# Patient Record
Sex: FEMALE | Race: OTHER | HISPANIC OR LATINO | URBAN - METROPOLITAN AREA
[De-identification: names, ages, dates, MRNs, and addresses within clinical notes are randomized per-mention and may not be internally consistent; named-entity substitution may affect disease eponyms.]

---

## 2021-07-18 ENCOUNTER — EMERGENCY (EMERGENCY)
Facility: HOSPITAL | Age: 86
LOS: 1 days | Discharge: HOME | End: 2021-07-18
Attending: EMERGENCY MEDICINE
Payer: MEDICARE

## 2021-07-18 VITALS
RESPIRATION RATE: 20 BRPM | TEMPERATURE: 98 F | OXYGEN SATURATION: 97 % | WEIGHT: 160.06 LBS | SYSTOLIC BLOOD PRESSURE: 149 MMHG | DIASTOLIC BLOOD PRESSURE: 75 MMHG | HEART RATE: 68 BPM

## 2021-07-18 PROCEDURE — 99283 EMERGENCY DEPT VISIT LOW MDM: CPT

## 2021-07-18 PROCEDURE — 99282 EMERGENCY DEPT VISIT SF MDM: CPT

## 2021-07-18 RX ORDER — SODIUM CHLORIDE 9 MG/ML
500 INJECTION INTRAMUSCULAR; INTRAVENOUS; SUBCUTANEOUS ONCE
Refills: 0 | Status: DISCONTINUED | OUTPATIENT
Start: 2021-07-18 | End: 2021-07-23

## 2021-07-18 NOTE — ED STATDOCS - NSFOLLOWUPINSTRUCTIONS_ED_ALL_ED_FT
1. Follow up with your primary care physician within 2-3days for reevaluation.  2.  Return to the Emergency Department for worsening, progressive or any other concerning symptoms.     1. Yared un seguimiento con schroeder médico de atención primaria dentro de 2-3 días para la reevaluación.  2. Volver al [facility] por empeoramiento, síntomas progresivos o de cualquier otro tipo.       Diarrea en los adultos    Diarrhea, Adult      La diarrea consiste en deposiciones frecuentes, blandas o acuosas. La diarrea puede hacerlo sentir débil y deshidratarlo. La deshidratación puede causarle cansancio, sed, sequedad en la boca y disminución en la frecuencia con la que orina.    Generalmente, la diarrea dura entre 2 y 3 días. Sin embargo, puede durar más tiempo si se trata de un signo de algo más erika. Es importante tratar la diarrea jassi se lo haya indicado el médico.      Siga estas indicaciones en schroeder casa:      Comida y bebida                 Siga estas recomendaciones jassi se lo haya indicado el médico:  •Villas ernie solución de rehidratación oral (oral rehydration solution, ORS). Es un medicamento de venta nohelia que ayuda a que el cuerpo recupere el equilibrio normal de nutrientes y agua. Se la encuentra en farmacias y tiendas minoristas.      •Sparkle abundantes líquidos, jassi agua, trocitos de hielo, jugos de fruta rebajados con agua y bebidas deportivas bajas en calorías. Puede beber leche también, si lo desea.      •Evite consumir líquidos que contengan mucha azúcar o cafeína, jassi bebidas energéticas, bebidas deportivas y refrescos.      •En la medida en que pueda, consuma alimentos blandos y fáciles de digerir en pequeñas cantidades. Estos alimentos incluyen bananas, compota de manzana, arroz, kylah magras, tostadas y galletas saladas.      •Evite stephanie alcohol.      •Evite los alimentos condimentados o con alto contenido de grasa.      Medicamentos     •Villas los medicamentos de venta nohelia y los recetados solamente jassi se lo haya indicado el médico.      •Si le recetaron un antibiótico, tómelo jassi se lo haya indicado el médico. No deje de usar el antibiótico aunque comience a sentirse mejor.        Indicaciones generales    •Lávese las ying frecuentemente usando agua y jabón. Use desinfectante para ying si no dispone de agua y jabón. Las demás personas de la casa deben lavarse las ying también. Las ying deben lavarse:  •Después de usar el baño o cambiar un pañal.       •Antes de preparar, cocinar o servir la comida.       •Mientras cuida de ernie persona enferma, o cuando visita a alguien en el hospital.         •Sparkle suficiente líquido jassi para mantener la orina de color amarillo pálido.      •Descanse en schroeder casa mientras se recupera.      •Controle schroeder afección para detectar cualquier cambio.      •Villas un baño caliente para ayudar a disminuir el ardor o el dolor causados por los episodios frecuentes de diarrea.      •Concurra a todas las visitas de control jassi se lo haya indicado el médico. [place] es importante.        Comuníquese con un médico si:    •Tiene fiebre.      •La diarrea empeora.      •Aparecen nuevos síntomas.      •No puede retener los líquidos.      •Se siente aturdido o mareado.      •Tiene dolor de debi.      •Tiene calambres musculares.        Solicite ayuda inmediatamente si:    •Siente dolor en el pecho.      •Se siente muy débil o se desmaya.      •Tiene heces con hserry, de color vilma, o con aspecto alquitranado.      •Tiene dolor intenso, cólicos o distensión abdominal.      •Tiene problemas para respirar o respira muy rápidamente.      •Schroeder corazón late muy rápidamente.      •Siente la piel fría y húmeda.      •Se siente confundido.    •Tiene signos de deshidratación, jassi los siguientes:  •Orina de color oscuro, muy escasa o falta de orina.      •Labios agrietados.      •Sequedad de boca.      •Ojos hundidos.      •Somnolencia.      •Debilidad.          Resumen    •La diarrea consiste en deposiciones frecuentes, blandas o acuosas. La diarrea puede hacerlo sentir débil y deshidratarlo.      •Sparkle suficiente líquido para mantener la orina de color amarillo pálido.      •Asegúrese de lavarse las ying después de usar el baño. Use desinfectante para ying si no dispone de agua y jabón.      •Comuníquese con un médico si la diarrea empeora o tiene nuevos síntomas.      •Busque ayuda de inmediato si tiene signos de deshidratación.      Esta información no tiene jassi fin reemplazar el consejo del médico. Asegúrese de hacerle al médico cualquier pregunta que tenga.

## 2021-07-18 NOTE — ED STATDOCS - OBJECTIVE STATEMENT
86y female with a PMHx of GERD, HTN presents to the ED c/o constant diarrhea 4-5x per day onset x3 days. Pt reports secondary symptoms of intermittent chills, but she isn't sure whether those were just related "to the environment or not". Pt reports she has tried hydrating with no relief of symptoms.     Pt denies vomiting, fever, bloody diarrhea.   : Vince [id number] 86y female with a PMHx of GERD, HTN presents to the ED c/o constant diarrhea 4-5x per day onset x3 days. Pt reports secondary symptoms of intermittent chills, but she isn't sure whether those were just related "to the environment or not". Pt reports she has tried hydrating with no relief of symptoms.      Pt denies vomiting, fever, bloody diarrhea.   : Vince [id number]

## 2021-07-18 NOTE — ED STATDOCS - PROGRESS NOTE DETAILS
Patient does not want to stay for labs, well appearing and well hydrated, requesting to be dc'd and f/u with PMD. Will dc. Shauna Tobar DO

## 2021-07-18 NOTE — ED STATDOCS - CLINICAL SUMMARY MEDICAL DECISION MAKING FREE TEXT BOX
86y female with GERD, HTN presenting with x3 days of diarrhea. Well-appearing, non tender abdomen. Check electrolytes, GI PCR. Likely DC if everything normal.

## 2021-07-18 NOTE — ED STATDOCS - PHYSICAL EXAMINATION
Gen: NAD, AOx3  Head: NCAT  HEENT: PERRL, oral mucosa moist, normal conjunctiva, oropharynx clear without exudate or erythema  Lung: CTAB, no respiratory distress, no wheezing, rales, rhonchi  CV: normal s1/s2, rrr, no murmurs, Normal perfusion, pulses 2+ throughout  Abd: soft, NTND, no CVA tenderness  MSK: No edema, no visible deformities, full range of motion in all 4 extremities  Neuro: CN II-XII grossly intact, No focal neurologic deficits  Skin: No rash   Psych: normal affect Gen: NAD, AOx3  Head: NCAT  HEENT: oral mucosa moist, normal conjunctiva, oropharynx clear without exudate or erythema  Lung: CTAB, no respiratory distress, no wheezing, rales, rhonchi  CV: normal s1/s2, rrr, no murmurs, Normal perfusion, pulses 2+ throughout  Abd: soft, NTND  MSK: No edema, no visible deformities, full range of motion in all 4 extremities  Neuro: No focal neurologic deficits  Skin: No rash   Psych: normal affect

## 2021-07-18 NOTE — ED STATDOCS - PATIENT PORTAL LINK FT
You can access the FollowMyHealth Patient Portal offered by [facility] by registering at the following website: [email or web address]. By joining [facility]’s FollowMyHealth portal, you will also be able to view your health information using other applications (apps) compatible with our system.

## 2022-07-11 PROBLEM — Z78.9 OTHER SPECIFIED HEALTH STATUS: Chronic | Status: ACTIVE | Noted: 2021-07-18

## 2022-07-19 ENCOUNTER — INPATIENT (INPATIENT)
Facility: HOSPITAL | Age: 87
LOS: 0 days | Discharge: HOME | DRG: 247 | End: 2022-07-20
Attending: INTERNAL MEDICINE | Admitting: INTERNAL MEDICINE
Payer: MEDICARE

## 2022-07-19 ENCOUNTER — TRANSCRIPTION ENCOUNTER (OUTPATIENT)
Age: 87
End: 2022-07-19

## 2022-07-19 VITALS
HEART RATE: 64 BPM | RESPIRATION RATE: 16 BRPM | SYSTOLIC BLOOD PRESSURE: 179 MMHG | TEMPERATURE: 98 F | OXYGEN SATURATION: 99 % | DIASTOLIC BLOOD PRESSURE: 76 MMHG

## 2022-07-19 DIAGNOSIS — R06.9 UNSPECIFIED ABNORMALITIES OF BREATHING: ICD-10-CM

## 2022-07-19 LAB
ANION GAP SERPL CALC-SCNC: 11 MMOL/L — SIGNIFICANT CHANGE UP (ref 5–17)
BUN SERPL-MCNC: 18.1 MG/DL — SIGNIFICANT CHANGE UP (ref 8–20)
CALCIUM SERPL-MCNC: 9.2 MG/DL — SIGNIFICANT CHANGE UP (ref 8.6–10.2)
CHLORIDE SERPL-SCNC: 105 MMOL/L — SIGNIFICANT CHANGE UP (ref 98–107)
CO2 SERPL-SCNC: 25 MMOL/L — SIGNIFICANT CHANGE UP (ref 22–29)
CREAT SERPL-MCNC: 0.49 MG/DL — LOW (ref 0.5–1.3)
EGFR: 92 ML/MIN/1.73M2 — SIGNIFICANT CHANGE UP
GLUCOSE SERPL-MCNC: 87 MG/DL — SIGNIFICANT CHANGE UP (ref 70–99)
HCT VFR BLD CALC: 38.7 % — SIGNIFICANT CHANGE UP (ref 34.5–45)
HGB BLD-MCNC: 13 G/DL — SIGNIFICANT CHANGE UP (ref 11.5–15.5)
MAGNESIUM SERPL-MCNC: 1.9 MG/DL — SIGNIFICANT CHANGE UP (ref 1.8–2.6)
MCHC RBC-ENTMCNC: 30.5 PG — SIGNIFICANT CHANGE UP (ref 27–34)
MCHC RBC-ENTMCNC: 33.6 GM/DL — SIGNIFICANT CHANGE UP (ref 32–36)
MCV RBC AUTO: 90.8 FL — SIGNIFICANT CHANGE UP (ref 80–100)
PLATELET # BLD AUTO: 201 K/UL — SIGNIFICANT CHANGE UP (ref 150–400)
POTASSIUM SERPL-MCNC: 4.5 MMOL/L — SIGNIFICANT CHANGE UP (ref 3.5–5.3)
POTASSIUM SERPL-SCNC: 4.5 MMOL/L — SIGNIFICANT CHANGE UP (ref 3.5–5.3)
RBC # BLD: 4.26 M/UL — SIGNIFICANT CHANGE UP (ref 3.8–5.2)
RBC # FLD: 12.7 % — SIGNIFICANT CHANGE UP (ref 10.3–14.5)
SODIUM SERPL-SCNC: 141 MMOL/L — SIGNIFICANT CHANGE UP (ref 135–145)
WBC # BLD: 6.95 K/UL — SIGNIFICANT CHANGE UP (ref 3.8–10.5)
WBC # FLD AUTO: 6.95 K/UL — SIGNIFICANT CHANGE UP (ref 3.8–10.5)

## 2022-07-19 PROCEDURE — 93010 ELECTROCARDIOGRAM REPORT: CPT

## 2022-07-19 RX ORDER — CLOPIDOGREL BISULFATE 75 MG/1
75 TABLET, FILM COATED ORAL DAILY
Refills: 0 | Status: DISCONTINUED | OUTPATIENT
Start: 2022-07-19 | End: 2022-07-20

## 2022-07-19 RX ORDER — SERTRALINE 25 MG/1
50 TABLET, FILM COATED ORAL DAILY
Refills: 0 | Status: DISCONTINUED | OUTPATIENT
Start: 2022-07-19 | End: 2022-07-20

## 2022-07-19 RX ORDER — MONTELUKAST 4 MG/1
10 TABLET, CHEWABLE ORAL DAILY
Refills: 0 | Status: DISCONTINUED | OUTPATIENT
Start: 2022-07-19 | End: 2022-07-20

## 2022-07-19 RX ORDER — LOSARTAN POTASSIUM 100 MG/1
50 TABLET, FILM COATED ORAL DAILY
Refills: 0 | Status: DISCONTINUED | OUTPATIENT
Start: 2022-07-19 | End: 2022-07-20

## 2022-07-19 RX ORDER — ASPIRIN/CALCIUM CARB/MAGNESIUM 324 MG
81 TABLET ORAL DAILY
Refills: 0 | Status: DISCONTINUED | OUTPATIENT
Start: 2022-07-19 | End: 2022-07-20

## 2022-07-19 RX ORDER — MONTELUKAST 4 MG/1
1 TABLET, CHEWABLE ORAL
Qty: 0 | Refills: 0 | DISCHARGE

## 2022-07-19 RX ORDER — DOCUSATE SODIUM 100 MG
0 CAPSULE ORAL
Qty: 0 | Refills: 0 | DISCHARGE

## 2022-07-19 RX ORDER — ASPIRIN/CALCIUM CARB/MAGNESIUM 324 MG
1 TABLET ORAL
Qty: 0 | Refills: 0 | DISCHARGE

## 2022-07-19 RX ORDER — PANTOPRAZOLE SODIUM 20 MG/1
1 TABLET, DELAYED RELEASE ORAL
Qty: 0 | Refills: 0 | DISCHARGE

## 2022-07-19 RX ORDER — PANTOPRAZOLE SODIUM 20 MG/1
40 TABLET, DELAYED RELEASE ORAL
Refills: 0 | Status: DISCONTINUED | OUTPATIENT
Start: 2022-07-19 | End: 2022-07-20

## 2022-07-19 RX ORDER — SERTRALINE 25 MG/1
1 TABLET, FILM COATED ORAL
Qty: 0 | Refills: 0 | DISCHARGE

## 2022-07-19 RX ORDER — ATORVASTATIN CALCIUM 80 MG/1
40 TABLET, FILM COATED ORAL AT BEDTIME
Refills: 0 | Status: DISCONTINUED | OUTPATIENT
Start: 2022-07-19 | End: 2022-07-20

## 2022-07-19 RX ORDER — LOSARTAN POTASSIUM 100 MG/1
1 TABLET, FILM COATED ORAL
Qty: 0 | Refills: 0 | DISCHARGE

## 2022-07-19 RX ADMIN — LOSARTAN POTASSIUM 50 MILLIGRAM(S): 100 TABLET, FILM COATED ORAL at 16:27

## 2022-07-19 RX ADMIN — ATORVASTATIN CALCIUM 40 MILLIGRAM(S): 80 TABLET, FILM COATED ORAL at 22:20

## 2022-07-19 NOTE — DISCHARGE NOTE PROVIDER - NSDCCPCAREPLAN_GEN_ALL_CORE_FT
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  Limited walking and stairs for 48 hours.    Change the bandaid after 24 hours and every 24 hours after that.  Keep the puncture site dry and covered with a bandaid until a scab forms.    Observe the site frequently.  If bleeding or a large lump (the size of a golf ball or bigger) occurs lie flat, apply continuous direct pressure just above the puncture site for at least 10 minutes, and notify your physician immediately.  If the bleeding cannot be controlled, call 911 immediately for assistance.  Notify your physician of pain, swelling or any drainage.    Notify your physician immediately if coldness, numbness, discoloration or pain in your foot occurs.         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  Limited walking and stairs for 48 hours.    Change the bandaid after 24 hours and every 24 hours after that.  Keep the puncture site dry and covered with a bandaid until a scab forms.    Observe the site frequently.  If bleeding or a large lump (the size of a golf ball or bigger) occurs lie flat, apply continuous direct pressure just above the puncture site for at least 10 minutes, and notify your physician immediately.  If the bleeding cannot be controlled, call 911 immediately for assistance.  Notify your physician of pain, swelling or any drainage.    Notify your physician immediately if coldness, numbness, discoloration or pain in your foot occurs.  PLEASE TAKE ASPIRIN AND PLAVIX DAILY WITHOUT INTERRUPTION  FOLLOW UP WITH DR. CLEANING IN 1-2 WEEKS   cardiac rehab info provided/referral and communication to cardiac rehab completed         SECONDARY DISCHARGE DIAGNOSES  Diagnosis: Hypertension  Assessment and Plan of Treatment: continue losartan    Diagnosis: Hyperlipidemia  Assessment and Plan of Treatment: increase crestor 20mg

## 2022-07-19 NOTE — H&P PST ADULT - ASSESSMENT
Plan: PRE-PROCEDURE ASSESSMENT    -Patient seen and examined  -Labs reviewed  -Pre-procedure teaching completed with patient   -Questions answered about patients concerns     -instructed to NPO after midnight.   - Pt instructed to have escort to and from procedure   - Normal saline 250 CC bolus   
No complaints

## 2022-07-19 NOTE — DISCHARGE NOTE PROVIDER - NSDCMRMEDTOKEN_GEN_ALL_CORE_FT
aspirin 81 mg oral tablet, chewable: 1 tab(s) orally once a day  Colace 100 mg oral capsule:   losartan 50 mg oral tablet: 1 tab(s) orally once a day  montelukast 10 mg oral tablet: 1 tab(s) orally once a day  pantoprazole 40 mg oral delayed release tablet: 1 tab(s) orally once a day  rosuvastatin 10 mg oral tablet: 1 tab(s) orally once a day  sertraline 50 mg oral tablet: 1 tab(s) orally once a day   aspirin 81 mg oral tablet, chewable: 1 tab(s) orally once a day  clopidogrel 75 mg oral tablet: 1 tab(s) orally once a day  Colace 100 mg oral capsule:   losartan 50 mg oral tablet: 1 tab(s) orally once a day  montelukast 10 mg oral tablet: 1 tab(s) orally once a day  pantoprazole 40 mg oral delayed release tablet: 1 tab(s) orally once a day  rosuvastatin 10 mg oral tablet: 1 tab(s) orally once a day (at bedtime)   sertraline 50 mg oral tablet: 1 tab(s) orally once a day   aspirin 81 mg oral tablet, chewable: 1 tab(s) orally once a day  clopidogrel 75 mg oral tablet: 1 tab(s) orally once a day  Colace 100 mg oral capsule:   Crestor 20 mg oral tablet: 1 tab(s) orally once a day (at bedtime)   losartan 50 mg oral tablet: 1 tab(s) orally once a day  montelukast 10 mg oral tablet: 1 tab(s) orally once a day  pantoprazole 40 mg oral delayed release tablet: 1 tab(s) orally once a day  sertraline 50 mg oral tablet: 1 tab(s) orally once a day

## 2022-07-19 NOTE — DISCHARGE NOTE PROVIDER - NSDCFUADDINST_GEN_ALL_CORE_FT
1. Continue to take antihypertensive medications as prescribed. Obtain a home blood pressure monitor (at any pharmacy or medical supply store) and monitor blood pressure trends. Call your doctor if you note you blood pressure to be running much higher or lower than usual.   2. Maintain a healthy low sodium diet no cold cut canned soup or frozen prepared meals. Low fat cuts of meat. Avoid cheeses as much as possible.   3. please take your new medication Plavix   4. take your cholesterol medication at bedtime   5. start an active life style to reduce cholesterol levels.

## 2022-07-19 NOTE — DISCHARGE NOTE PROVIDER - HOSPITAL COURSE
Department of Cardiology                                                                  [facility]/[place] [address] [address] [facility]                                                            Telephone: [phone]. Fax:[phone]                                                    Post- Procedure Note: Left Heart Cardiac Catheterization       Narrative: 86 yo female with HTN HLD recent abnormal NST of inferior lateral area.     Now s/p LHC via Right FA With Dr Villatoro. Received 1 Xience KASIA to the RPL . Small hematoma to right groin angioseal site hemostasis attained after manual pressure . No complaints Post Cath from patient.           PAST MEDICAL & SURGICAL HISTORY:  No pertinent past medical history  No significant past surgical history    Home Medications:  aspirin 81 mg oral tablet, chewable: 1 tab(s) orally once a day (19 Jul 2022 11:30)  Colace 100 mg oral capsule:  (19 Jul 2022 11:30)  losartan 50 mg oral tablet: 1 tab(s) orally once a day (19 Jul 2022 11:30)  montelukast 10 mg oral tablet: 1 tab(s) orally once a day (19 Jul 2022 11:30)  pantoprazole 40 mg oral delayed release tablet: 1 tab(s) orally once a day (19 Jul 2022 11:30)  rosuvastatin 10 mg oral tablet: 1 tab(s) orally once a day (19 Jul 2022 11:30)  sertraline 50 mg oral tablet: 1 tab(s) orally once a day (19 Jul 2022 11:30)    No Known Allergies      Objective:  Vital Signs Last 24 Hrs  T(C): 36.6 (19 Jul 2022 11:30), Max: 36.6 (19 Jul 2022 11:30)  T(F): 97.9 (19 Jul 2022 11:30), Max: 97.9 (19 Jul 2022 11:30)  HR: 55 (19 Jul 2022 14:45) (54 - 64)  BP: 171/72 (19 Jul 2022 14:45) (171/72 - 185/74)  BP(mean): --  RR: 16 (19 Jul 2022 14:45) (16 - 16)  SpO2: 98% (19 Jul 2022 14:45) (97% - 99%)        PHYSICAL EXAM:  Constitutional: A & O x 3, NAD  HEENT:  Normal oral mucosa, PERRL, EOMI	  Cardiovascular: S1 S2, No murmurs, No JVD  Respiratory: Lungs clear to auscultation	  Gastrointestinal:  Soft, Non-tender, + BS	  Skin: No rashes or cyanosis  Neurologic: No deficit appreciated  Extremities: Normal range of motion, no edema  Vascular: Access site stable, no bleed or hematoma, distal pulses +   Small hematoma compressed , good hemostasis with out any further expansion or bleeding of site.     Labs:                           13.0   6.95  )-----------( 201      ( 19 Jul 2022 11:20 )             38.7     07-19    141  |  105  |  18.1  ----------------------------<  87  4.5   |  25.0  |  0.49<L>    Ca    9.2      19 Jul 2022 11:20  Mg     1.9     07-19            Assessment:Narrative: 86 yo female with HTN HLD recent abnormal NST of inferior lateral area. Now s/p LHC via Right FA With Dr Villatoro. Received 1 Xience KASIA to the RPL .       Plan:  -Formal cath report pending  -Post procedure management/monitoring per protocol  -Access site precautions  -Bedrest 2 hours hours post procedure  -Labs and EKG in am  -Repeat ECG if any clinical indication or change on tele  -NS 250mL bolus post cath  x1  -Continue current medical therapy  -Dual anti platelet therapy with aspirin/plavix indefinitely   -Cont Crestor losartan   - no BB due to baseline of Bradycardia   -Educated regarding strict adherence with DAPT   -Educated regarding post procedure management and care  -Cardiac rehab info provided/referral and communication to cardiac rehab completed  -F/U outpt in 1-2 weeks with Cardiologist Dr. Villatoro   -DISPO:  Plan for D/C in am if remains HDS, ECG and labs in am stable and without complications           Brief Hospital Course: 88 yo female patient of Dr Rodriguez with history of HTN HLD recent complints of SOB and Left shoulder pain.  NST completed by Dr Villatoro revealed perfusion deficit small moderate  severity prox inferior-lateral.   s/p LHC via Right FA With Dr Villatoro. Received 1 Xience KASIA to the RPL . Small hematoma to right groin angioseal site hemostasis attained after manual pressure. This morning Right groin site with moderate ecchymosis, no hematoma, no bruit, + distal pulse. Labs and EKG reviewed, stable and no concerns.     Vital Signs Last 24 Hrs  T(C): 36.8 (20 Jul 2022 06:00), Max: 36.8 (20 Jul 2022 06:00)  T(F): 98.3 (20 Jul 2022 06:00), Max: 98.3 (20 Jul 2022 06:00)  HR: 50 (20 Jul 2022 06:00) (43 - 64)  BP: 138/63 (20 Jul 2022 06:00) (138/63 - 185/74)  RR: 15 (20 Jul 2022 06:00) (15 - 18)  SpO2: 96% (20 Jul 2022 06:00) (65% - 99%)    Parameters below as of 20 Jul 2022 06:00  Patient On (Oxygen Delivery Method): room air        Physical Exam:  General: Well developed, well nourished, No Acute Distress  HEENT: Normocephallic Atraumatic, PERRLA, EOMI bl, moist mucous membranes   Neck: Supple, nontender, no mass  Neurology: AA&Ox3, Cranial Nerves II-XII grossly intact, sensation intact  Respiratory: Clear To Auscultation B/L, No Wheezes, rhonchi or rales  CV: Regular Rate and Rhythm, +S1/S2, no murmurs, rubs or gallops  Abdominal: Soft, Non-Tender, Non-Distended +Bowel Soundsx4  Extremities: No Clubbing, cyanosis or edema, + peripheral pulses  Musculoskeletal: Normal Range of motion, no joint erythema or warmth, no joint swelling   Skin:  Right groin site with moderate ecchymosis, no hematoma    Patient was medically optimized and improved clinically throughout hospital. Patient seen and examined on day of discharge. All electrolyte abnormalities were monitored carefully and repleted as necessary during this hospitalization. At the time of discharge patient was hemodynamically stable and amenable to all terms of discharge. The patient has received verbal instructions from myself regarding discharge plans.       Plan:  -Formal cath report pending  -Post procedure management/monitoring per protocol  -Access site precautions  -Bedrest 2 hours hours post procedure  -Labs and EKG in am  -Repeat ECG if any clinical indication or change on tele  -NS 250mL bolus post cath  x1  -Continue current medical therapy  -Dual anti platelet therapy with aspirin/plavix indefinitely   -Cont losartan   -increase crestor 20mg   - no BB due to baseline of Bradycardia   -Educated regarding strict adherence with DAPT   -Educated regarding post procedure management and care  -Cardiac rehab info provided/referral and communication to cardiac rehab completed  -F/U outpt in 1-2 weeks with Cardiologist Dr. Villatoro   -DISPO:  Plan for D/C in am if remains HDS, ECG and labs in am stable and without complications             Brief Hospital Course: 88 yo female patient of Dr Rodriguez with history of HTN HLD recent complints of SOB and Left shoulder pain.  NST completed by Dr Villatoro revealed perfusion deficit small moderate  severity prox inferior-lateral.   s/p LHC via Right FA With Dr Villatoro. Received 1 Xience KASIA to the RPL . Small hematoma to right groin angioseal site hemostasis attained after manual pressure. This morning Right groin site with moderate ecchymosis, no hematoma, no bruit, + distal pulse. Labs and EKG reviewed, stable and no concerns.     Discharge instructions provided with misha Jack interpreter     Vital Signs Last 24 Hrs  T(C): 36.8 (20 Jul 2022 06:00), Max: 36.8 (20 Jul 2022 06:00)  T(F): 98.3 (20 Jul 2022 06:00), Max: 98.3 (20 Jul 2022 06:00)  HR: 50 (20 Jul 2022 06:00) (43 - 64)  BP: 138/63 (20 Jul 2022 06:00) (138/63 - 185/74)  RR: 15 (20 Jul 2022 06:00) (15 - 18)  SpO2: 96% (20 Jul 2022 06:00) (65% - 99%)    Parameters below as of 20 Jul 2022 06:00  Patient On (Oxygen Delivery Method): room air        Physical Exam:  General: Well developed, well nourished, No Acute Distress  HEENT: Normocephallic Atraumatic, PERRLA, EOMI bl, moist mucous membranes   Neck: Supple, nontender, no mass  Neurology: AA&Ox3, Cranial Nerves II-XII grossly intact, sensation intact  Respiratory: Clear To Auscultation B/L, No Wheezes, rhonchi or rales  CV: Regular Rate and Rhythm, +S1/S2, no murmurs, rubs or gallops  Abdominal: Soft, Non-Tender, Non-Distended +Bowel Soundsx4  Extremities: No Clubbing, cyanosis or edema, + peripheral pulses  Musculoskeletal: Normal Range of motion, no joint erythema or warmth, no joint swelling   Skin:  Right groin site with moderate ecchymosis, no hematoma    Patient was medically optimized and improved clinically throughout hospital. Patient seen and examined on day of discharge. All electrolyte abnormalities were monitored carefully and repleted as necessary during this hospitalization. At the time of discharge patient was hemodynamically stable and amenable to all terms of discharge. The patient has received verbal instructions from myself regarding discharge plans.       Plan:  -Formal cath report pending  -Post procedure management/monitoring per protocol  -Access site precautions  -Bedrest 2 hours hours post procedure  -Labs and EKG in am  -Repeat ECG if any clinical indication or change on tele  -NS 250mL bolus post cath  x1  -Continue current medical therapy  -Dual anti platelet therapy with aspirin/plavix indefinitely   -Cont losartan   -increase crestor 20mg   - no BB due to baseline of Bradycardia   -Educated regarding strict adherence with DAPT   -Educated regarding post procedure management and care  -Cardiac rehab info provided/referral and communication to cardiac rehab completed  -F/U outpt in 1-2 weeks with Cardiologist Dr. Villatoro   -DISPO:  Plan for D/C in am if remains HDS, ECG and labs in am stable and without complications

## 2022-07-19 NOTE — DISCHARGE NOTE PROVIDER - CARE PROVIDER_API CALL
Naseem Villatoro)  Cardiovascular Disease; Interventional Cardiology  [address] [place] [address]  Phone: [phone]  Fax: [phone]  Follow Up Time:

## 2022-07-19 NOTE — H&P PST ADULT - HISTORY OF PRESENT ILLNESS
86 yo female patient of Dr Rodriguez with history of HTN HLD recent complints of SOB and Left shoulder pain.  NST completed by Dr Villatoro revealed perfusion deficit small moderate  severity prox inferior-lateral.       ECHO Normal LV  Aortic Sclerosis   Mitral Insuffiencey   Mild TR     Risk factors   HTN  HDL   No CVA   NO PE   No Contrast allergy       Mallampati 3  ASA 3   BRA 2.3

## 2022-07-19 NOTE — DISCHARGE NOTE PROVIDER - NSDCCPTREATMENT_GEN_ALL_CORE_FT
PRINCIPAL PROCEDURE  Procedure: Insertion, coronary artery stent  Findings and Treatment: Do not stop Aspirin or Plavix without speaking with cardiologist first  plavix 75mg po QD with Aspirin 81mg po QD

## 2022-07-19 NOTE — DISCHARGE NOTE PROVIDER - NSDCACTIVITY_GEN_ALL_CORE
No heavy lifting, driving, sex, tub baths, swimming, or any activity that submerges the lower half of the body in water for 48 hours.  Limited walking and stairs for 48 hours.    Change the bandaid after 24 hours and every 24 hours after that.  Keep the puncture site dry and covered with a bandaid until a scab forms.    Observe the site frequently.  If bleeding or a large lump (the size of a golf ball or bigger) occurs lie flat, apply continuous direct pressure just above the puncture site for at least 10 minutes, and notify your physician immediately.  If the bleeding cannot be controlled, call 911 immediately for assistance.  Notify your physician of pain, swelling or any drainage.    Notify your physician immediately if coldness, numbness, discoloration or pain in your foot occurs./Showering allowed No heavy lifting, driving, sex, tub baths, swimming, or any activity that submerges the lower half of the body in water for 48 hours.  Limited walking and stairs for 48 hours.    Change the bandaid after 24 hours and every 24 hours after that.  Keep the puncture site dry and covered with a bandaid until a scab forms.    Observe the site frequently.  If bleeding or a large lump (the size of a golf ball or bigger) occurs lie flat, apply continuous direct pressure just above the puncture site for at least 10 minutes, and notify your physician immediately.  If the bleeding cannot be controlled, call 911 immediately for assistance.  Notify your physician of pain, swelling or any drainage.    Notify your physician immediately if coldness, numbness, discoloration or pain in your foot occurs./Showering allowed/No heavy lifting/straining

## 2022-07-19 NOTE — H&P PST ADULT - MARITAL STATUS
Patient Instructions by Ayanna Higginbotham MD at 7/8/2020  1:00 PM     Author: Ayanna Higginbotham MD Service: -- Author Type: Physician    Filed: 7/8/2020  1:37 PM Encounter Date: 7/8/2020 Status: Addendum    : Ayanna Higginbotham MD (Physician)    Related Notes: Original Note by Ayanna Higginbotham MD (Physician) filed at 7/8/2020  1:19 PM         7/8/2020  Wt Readings from Last 1 Encounters:   07/08/20 30 lb 6.4 oz (13.8 kg) (72 %, Z= 0.58)*     * Growth percentiles are based on CDC (Boys, 2-20 Years) data.       Acetaminophen Dosing Instructions  (May take every 4-6 hours)      WEIGHT   AGE Infant/Children's  160mg/5ml Children's   Chewable Tabs  80 mg each Jeferson Strength  Chewable Tabs  160 mg     Milliliter (ml) Soft Chew Tabs Chewable Tabs   6-11 lbs 0-3 months 1.25 ml     12-17 lbs 4-11 months 2.5 ml     18-23 lbs 12-23 months 3.75 ml     24-35 lbs 2-3 years 5 ml 2 tabs    36-47 lbs 4-5 years 7.5 ml 3 tabs    48-59 lbs 6-8 years 10 ml 4 tabs 2 tabs   60-71 lbs 9-10 years 12.5 ml 5 tabs 2.5 tabs   72-95 lbs 11 years 15 ml 6 tabs 3 tabs   96 lbs and over 12 years   4 tabs     Ibuprofen Dosing Instructions- Liquid  (May take every 6-8 hours)      WEIGHT   AGE Concentrated Drops   50 mg/1.25 ml Infant/Children's   100 mg/5ml     Dropperful Milliliter (ml)   12-17 lbs 6- 11 months 1 (1.25 ml)    18-23 lbs 12-23 months 1 1/2 (1.875 ml)    24-35 lbs 2-3 years  5 ml   36-47 lbs 4-5 years  7.5 ml   48-59 lbs 6-8 years  10 ml   60-71 lbs 9-10 years  12.5 ml   72-95 lbs 11 years  15 ml        Patient Education      BRIGHT FUTURES HANDOUT- PARENT  2 YEAR VISIT  Here are some suggestions from [employer] experts that may be of value to your family.     HOW YOUR FAMILY IS DOING  Take time for yourself and your partner.  Stay in touch with friends.  Make time for family activities. Spend time with each child.  Teach your child not to hit, bite, or hurt other people. Be a role model.  If  you feel unsafe in your home or have been hurt by someone, let us know. Hotlines and community resources can also provide confidential help.  Dont smoke or use e-cigarettes. Keep your home and car smoke-free. Tobacco-free spaces keep children healthy.  Dont use alcohol or drugs.  Accept help from family and friends.  If you are worried about your living or food situation, reach out for help. Community agencies and programs such as WIC and SNAP can provide information and assistance.    YOUR MAURO BEHAVIOR  Praise your child when he does what you ask him to do.  Listen to and respect your child. Expect others to as well.  Help your child talk about his feelings.  Watch how he responds to new people or situations.  Read, talk, sing, and explore together. These activities are the best ways to help toddlers learn.  Limit TV, tablet, or smartphone use to no more than 1 hour of high-quality programs each day.  It is better for toddlers to play than to watch TV.  Encourage your child to play for up to 60 minutes a day.  Avoid TV during meals. Talk together instead.    TALKING AND YOUR CHILD  Use clear, simple language with your child. Dont use baby talk.  Talk slowly and remember that it may take a while for your child to respond. Your child should be able to follow simple instructions.  Read to your child every day. Your child may love hearing the same story over and over.  Talk about and describe pictures in books.  Talk about the things you see and hear when you are together.  Ask your child to point to things as you read.  Stop a story to let your child make an animal sound or finish a part of the story.    TOILET TRAINING  Begin toilet training when your child is ready. Signs of being ready for toilet training include  Staying dry for 2 hours  Knowing if she is wet or dry  Can pull pants down and up  Wanting to learn  Can tell you if she is going to have a bowel movement  Plan for toilet breaks often. Children use  the toilet as many as 10 times each day.  Teach your child to wash her hands after using the toilet.  Clean potty-chairs after every use.  Take the child to choose underwear when she feels ready to do so.    SAFETY  Make sure your mauro car safety seat is rear facing until he reaches the highest weight or height allowed by the car safety seats . Once your child reaches these limits, it is time to switch the seat to the forward- facing position.  Make sure the car safety seat is installed correctly in the back seat. The harness straps should be snug against your mauro chest.  Children watch what you do. Everyone should wear a lap and shoulder seat belt in the car.  Never leave your child alone in your home or yard, especially near cars or machinery, without a responsible adult in charge.  When backing out of the garage or driving in the driveway, have another adult hold your child a safe distance away so he is not in the path of your car.  Have your child wear a helmet that fits properly when riding bikes and trikes.  If it is necessary to keep a gun in your home, store it unloaded and locked with the ammunition locked separately.    WHAT TO EXPECT AT YOUR MAURO 2  YEAR VISIT  We will talk about  Creating family routines  Supporting your talking child  Getting along with other children  Getting ready for   Keeping your child safe at home, outside, and in the car      Helpful Resources: National Domestic Violence Hotline: 510.866.6813  Poison Help Line:  391.946.3377  Information About Car Safety Seats: www.safercar.gov/parents  Toll-free Auto Safety Hotline: 306.445.7574  Consistent with Bright Futures: Guidelines for Health Supervision of Infants, Children, and Adolescents, 4th Edition  For more information, go to https://brightfutures.aap.org.                     Single

## 2022-07-19 NOTE — PROGRESS NOTE ADULT - SUBJECTIVE AND OBJECTIVE BOX
Department of Cardiology                                                                  [facility]/[place] [address]                                                            Telephone: [phone]. Fax:[phone]                                                    Post- Procedure Note: Left Heart Cardiac Catheterization       Narrative: 86 yo female with HTN HLD recent abnormal NST of inferior lateral area.     Now s/p LHC via Right FA With Dr Villatoro. Received 1 Xience KASIA to the R[facility] . Small hematoma to right groin angioseal site hemostasis attained after manual pressure . No complaints Post Cath from patient.           PAST MEDICAL & SURGICAL HISTORY:  No pertinent past medical history  No significant past surgical history    Home Medications:  aspirin 81 mg oral tablet, chewable: 1 tab(s) orally once a day (19 Jul 2022 11:30)  Colace 100 mg oral capsule:  (19 Jul 2022 11:30)  losartan 50 mg oral tablet: 1 tab(s) orally once a day (19 Jul 2022 11:30)  montelukast 10 mg oral tablet: 1 tab(s) orally once a day (19 Jul 2022 11:30)  pantoprazole 40 mg oral delayed release tablet: 1 tab(s) orally once a day (19 Jul 2022 11:30)  rosuvastatin 10 mg oral tablet: 1 tab(s) orally once a day (19 Jul 2022 11:30)  sertraline 50 mg oral tablet: 1 tab(s) orally once a day (19 Jul 2022 11:30)    No Known Allergies      Objective:  Vital Signs Last 24 Hrs  T(C): 36.6 (19 Jul 2022 11:30), Max: 36.6 (19 Jul 2022 11:30)  T(F): 97.9 (19 Jul 2022 11:30), Max: 97.9 (19 Jul 2022 11:30)  HR: 55 (19 Jul 2022 14:45) (54 - 64)  BP: 171/72 (19 Jul 2022 14:45) (171/72 - 185/74)  BP(mean): --  RR: 16 (19 Jul 2022 14:45) (16 - 16)  SpO2: 98% (19 Jul 2022 14:45) (97% - 99%)        PHYSICAL EXAM:  Constitutional: A & O x 3, NAD  HEENT:  Normal oral mucosa, PERRL, EOMI	  Cardiovascular: S1 S2, No murmurs, No JVD  Respiratory: Lungs clear to auscultation	  Gastrointestinal:  Soft, Non-tender, + BS	  Skin: No rashes or cyanosis  Neurologic: No deficit appreciated  Extremities: Normal range of motion, no edema  Vascular: Access site stable, no bleed or hematoma, distal pulses +   Small hematoma compressed , good hemostasis with out any further expansion or bleeding of site.     Labs:                           13.0   6.95  )-----------( 201      ( 19 Jul 2022 11:20 )             38.7     07-19    141  |  105  |  18.1  ----------------------------<  87  4.5   |  25.0  |  0.49<L>    Ca    9.2      19 Jul 2022 11:20  Mg     1.9     07-19            Assessment:Narrative: 86 yo female with HTN HLD recent abnormal NST of inferior lateral area. Now s/p LHC via Right FA With Dr Villatoro. Received 1 Xience KASIA to the R[facility] .       Plan:  -Formal cath report pending  -Post procedure management/monitoring per protocol  -Access site precautions  -Bedrest 2 hours hours post procedure  -Labs and EKG in am  -Repeat ECG if any clinical indication or change on tele  -NS 250mL bolus post cath  x1  -Continue current medical therapy  -Dual anti platelet therapy with aspirin/plavix indefinitely   -Cont Crestor losartan   - no BB due to baseline of Bradycardia   -Educated regarding strict adherence with DAPT   -Educated regarding post procedure management and care  -Cardiac rehab info provided/referral and communication to cardiac rehab completed  -F/U outpt in 1-2 weeks with Cardiologist Dr. Villatoro   -DISPO:  Plan for D/C in am if remains HDS, ECG and labs in am stable and without complications

## 2022-07-20 ENCOUNTER — TRANSCRIPTION ENCOUNTER (OUTPATIENT)
Age: 87
End: 2022-07-20

## 2022-07-20 VITALS — TEMPERATURE: 98 F

## 2022-07-20 LAB
ALBUMIN SERPL ELPH-MCNC: 3.7 G/DL — SIGNIFICANT CHANGE UP (ref 3.3–5.2)
ALP SERPL-CCNC: 95 U/L — SIGNIFICANT CHANGE UP (ref 40–120)
ALT FLD-CCNC: 10 U/L — SIGNIFICANT CHANGE UP
ANION GAP SERPL CALC-SCNC: 10 MMOL/L — SIGNIFICANT CHANGE UP (ref 5–17)
AST SERPL-CCNC: 14 U/L — SIGNIFICANT CHANGE UP
BASOPHILS # BLD AUTO: 0.06 K/UL — SIGNIFICANT CHANGE UP (ref 0–0.2)
BASOPHILS NFR BLD AUTO: 0.8 % — SIGNIFICANT CHANGE UP (ref 0–2)
BILIRUB SERPL-MCNC: 0.3 MG/DL — LOW (ref 0.4–2)
BUN SERPL-MCNC: 20 MG/DL — SIGNIFICANT CHANGE UP (ref 8–20)
CALCIUM SERPL-MCNC: 8.9 MG/DL — SIGNIFICANT CHANGE UP (ref 8.6–10.2)
CHLORIDE SERPL-SCNC: 106 MMOL/L — SIGNIFICANT CHANGE UP (ref 98–107)
CO2 SERPL-SCNC: 25 MMOL/L — SIGNIFICANT CHANGE UP (ref 22–29)
CREAT SERPL-MCNC: 0.61 MG/DL — SIGNIFICANT CHANGE UP (ref 0.5–1.3)
EGFR: 87 ML/MIN/1.73M2 — SIGNIFICANT CHANGE UP
EOSINOPHIL # BLD AUTO: 0.12 K/UL — SIGNIFICANT CHANGE UP (ref 0–0.5)
EOSINOPHIL NFR BLD AUTO: 1.6 % — SIGNIFICANT CHANGE UP (ref 0–6)
GLUCOSE SERPL-MCNC: 112 MG/DL — HIGH (ref 70–99)
HCT VFR BLD CALC: 36.1 % — SIGNIFICANT CHANGE UP (ref 34.5–45)
HGB BLD-MCNC: 12.5 G/DL — SIGNIFICANT CHANGE UP (ref 11.5–15.5)
IMM GRANULOCYTES NFR BLD AUTO: 0.3 % — SIGNIFICANT CHANGE UP (ref 0–1.5)
LYMPHOCYTES # BLD AUTO: 1.55 K/UL — SIGNIFICANT CHANGE UP (ref 1–3.3)
LYMPHOCYTES # BLD AUTO: 20.3 % — SIGNIFICANT CHANGE UP (ref 13–44)
MCHC RBC-ENTMCNC: 30.6 PG — SIGNIFICANT CHANGE UP (ref 27–34)
MCHC RBC-ENTMCNC: 34.6 GM/DL — SIGNIFICANT CHANGE UP (ref 32–36)
MCV RBC AUTO: 88.5 FL — SIGNIFICANT CHANGE UP (ref 80–100)
MONOCYTES # BLD AUTO: 0.66 K/UL — SIGNIFICANT CHANGE UP (ref 0–0.9)
MONOCYTES NFR BLD AUTO: 8.6 % — SIGNIFICANT CHANGE UP (ref 2–14)
NEUTROPHILS # BLD AUTO: 5.23 K/UL — SIGNIFICANT CHANGE UP (ref 1.8–7.4)
NEUTROPHILS NFR BLD AUTO: 68.4 % — SIGNIFICANT CHANGE UP (ref 43–77)
PLATELET # BLD AUTO: 188 K/UL — SIGNIFICANT CHANGE UP (ref 150–400)
POTASSIUM SERPL-MCNC: 3.9 MMOL/L — SIGNIFICANT CHANGE UP (ref 3.5–5.3)
POTASSIUM SERPL-SCNC: 3.9 MMOL/L — SIGNIFICANT CHANGE UP (ref 3.5–5.3)
PROT SERPL-MCNC: 6.3 G/DL — LOW (ref 6.6–8.7)
RBC # BLD: 4.08 M/UL — SIGNIFICANT CHANGE UP (ref 3.8–5.2)
RBC # FLD: 12.4 % — SIGNIFICANT CHANGE UP (ref 10.3–14.5)
SODIUM SERPL-SCNC: 141 MMOL/L — SIGNIFICANT CHANGE UP (ref 135–145)
WBC # BLD: 7.64 K/UL — SIGNIFICANT CHANGE UP (ref 3.8–10.5)
WBC # FLD AUTO: 7.64 K/UL — SIGNIFICANT CHANGE UP (ref 3.8–10.5)

## 2022-07-20 PROCEDURE — 93010 ELECTROCARDIOGRAM REPORT: CPT

## 2022-07-20 PROCEDURE — 83735 ASSAY OF MAGNESIUM: CPT

## 2022-07-20 PROCEDURE — 93458 L HRT ARTERY/VENTRICLE ANGIO: CPT | Mod: XU

## 2022-07-20 PROCEDURE — C1760: CPT

## 2022-07-20 PROCEDURE — 80053 COMPREHEN METABOLIC PANEL: CPT

## 2022-07-20 PROCEDURE — C1769: CPT

## 2022-07-20 PROCEDURE — 85025 COMPLETE CBC W/AUTO DIFF WBC: CPT

## 2022-07-20 PROCEDURE — C1874: CPT

## 2022-07-20 PROCEDURE — 36415 COLL VENOUS BLD VENIPUNCTURE: CPT

## 2022-07-20 PROCEDURE — 85027 COMPLETE CBC AUTOMATED: CPT

## 2022-07-20 PROCEDURE — 93005 ELECTROCARDIOGRAM TRACING: CPT

## 2022-07-20 PROCEDURE — C9600: CPT | Mod: RC

## 2022-07-20 PROCEDURE — C1894: CPT

## 2022-07-20 PROCEDURE — C1887: CPT

## 2022-07-20 PROCEDURE — 80048 BASIC METABOLIC PNL TOTAL CA: CPT

## 2022-07-20 RX ORDER — ROSUVASTATIN CALCIUM 5 MG/1
1 TABLET ORAL
Qty: 90 | Refills: 0
Start: 2022-07-20

## 2022-07-20 RX ORDER — CLOPIDOGREL BISULFATE 75 MG/1
1 TABLET, FILM COATED ORAL
Qty: 90 | Refills: 0
Start: 2022-07-20

## 2022-07-20 RX ORDER — ROSUVASTATIN CALCIUM 5 MG/1
1 TABLET ORAL
Qty: 0 | Refills: 0 | DISCHARGE

## 2022-07-20 RX ADMIN — PANTOPRAZOLE SODIUM 40 MILLIGRAM(S): 20 TABLET, DELAYED RELEASE ORAL at 06:18

## 2022-07-20 RX ADMIN — LOSARTAN POTASSIUM 50 MILLIGRAM(S): 100 TABLET, FILM COATED ORAL at 05:42

## 2022-07-20 NOTE — DISCHARGE NOTE NURSING/CASE MANAGEMENT/SOCIAL WORK - NSDCPEFALRISK_GEN_ALL_CORE
For information on Fall & Injury Prevention, visit: [email or web address] OR  [email or web address] OR  https://www.cdc.gov/steadi/patient.html

## 2022-07-20 NOTE — DISCHARGE NOTE NURSING/CASE MANAGEMENT/SOCIAL WORK - PATIENT PORTAL LINK FT
You can access the FollowMyHealth Patient Portal offered by [facility] by registering at the following website: [email or web address]. By joining [employer]’s FollowMyHealth portal, you will also be able to view your health information using other applications (apps) compatible with our system.

## 2023-10-03 ENCOUNTER — APPOINTMENT (OUTPATIENT)
Dept: OTOLARYNGOLOGY | Facility: CLINIC | Age: 88
End: 2023-10-03

## 2023-10-03 PROBLEM — Z00.00 ENCOUNTER FOR PREVENTIVE HEALTH EXAMINATION: Status: ACTIVE | Noted: 2023-10-03

## 2024-06-21 ENCOUNTER — OFFICE (OUTPATIENT)
Dept: URBAN - METROPOLITAN AREA CLINIC 113 | Facility: CLINIC | Age: 89
Setting detail: OPHTHALMOLOGY
End: 2024-06-21
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
## 2024-06-21 PROCEDURE — NO SHOW FE NO SHOW FEE: Performed by: OPHTHALMOLOGY

## 2024-09-23 ENCOUNTER — OFFICE (OUTPATIENT)
Dept: URBAN - METROPOLITAN AREA CLINIC 105 | Facility: CLINIC | Age: 89
Setting detail: OPHTHALMOLOGY
End: 2024-09-23
Payer: MEDICARE

## 2024-09-23 DIAGNOSIS — H34.8311: ICD-10-CM

## 2024-09-23 PROCEDURE — 92134 CPTRZ OPH DX IMG PST SGM RTA: CPT | Performed by: OPHTHALMOLOGY

## 2024-09-23 PROCEDURE — 92235 FLUORESCEIN ANGRPH MLTIFRAME: CPT | Performed by: OPHTHALMOLOGY

## 2024-09-23 PROCEDURE — 67028 INJECTION EYE DRUG: CPT | Mod: RT | Performed by: OPHTHALMOLOGY

## 2024-09-23 ASSESSMENT — CONFRONTATIONAL VISUAL FIELD TEST (CVF)
OD_FINDINGS: FULL
OS_FINDINGS: FULL

## 2024-10-16 ENCOUNTER — OFFICE (OUTPATIENT)
Dept: URBAN - METROPOLITAN AREA CLINIC 105 | Facility: CLINIC | Age: 89
Setting detail: OPHTHALMOLOGY
End: 2024-10-16
Payer: MEDICARE

## 2024-10-16 DIAGNOSIS — H34.8311: ICD-10-CM

## 2024-10-16 PROCEDURE — 67210 TREATMENT OF RETINAL LESION: CPT | Mod: RT | Performed by: OPHTHALMOLOGY

## 2024-10-16 PROCEDURE — 92134 CPTRZ OPH DX IMG PST SGM RTA: CPT | Performed by: OPHTHALMOLOGY

## 2024-10-16 ASSESSMENT — TONOMETRY
OD_IOP_MMHG: 15
OS_IOP_MMHG: 18

## 2024-10-16 ASSESSMENT — REFRACTION_AUTOREFRACTION
OS_CYLINDER: -0.75
OD_SPHERE: -1.00
OS_SPHERE: -0.75
OS_AXIS: 102
OD_AXIS: 114
OD_CYLINDER: -1.75

## 2024-10-16 ASSESSMENT — KERATOMETRY
OD_K1POWER_DIOPTERS: 44.50
OS_AXISANGLE_DEGREES: 026
OD_K2POWER_DIOPTERS: 45.50
OS_K1POWER_DIOPTERS: 45.00
OD_AXISANGLE_DEGREES: 011
OS_K2POWER_DIOPTERS: 45.75

## 2024-10-16 ASSESSMENT — VISUAL ACUITY
OD_BCVA: 20/40+2
OS_BCVA: 20/400

## 2024-10-16 ASSESSMENT — CONFRONTATIONAL VISUAL FIELD TEST (CVF)
OS_FINDINGS: FULL
OD_FINDINGS: FULL

## 2024-10-31 ENCOUNTER — OFFICE (OUTPATIENT)
Dept: URBAN - METROPOLITAN AREA CLINIC 113 | Facility: CLINIC | Age: 89
Setting detail: OPHTHALMOLOGY
End: 2024-10-31
Payer: MEDICARE

## 2024-10-31 DIAGNOSIS — H40.1132: ICD-10-CM

## 2024-10-31 PROCEDURE — 92250 FUNDUS PHOTOGRAPHY W/I&R: CPT | Performed by: OPHTHALMOLOGY

## 2024-10-31 PROCEDURE — 92012 INTRM OPH EXAM EST PATIENT: CPT | Mod: 24 | Performed by: OPHTHALMOLOGY

## 2024-10-31 ASSESSMENT — VISUAL ACUITY
OS_BCVA: 20/250
OD_BCVA: 20/40

## 2024-10-31 ASSESSMENT — CONFRONTATIONAL VISUAL FIELD TEST (CVF)
OD_FINDINGS: FULL
OS_FINDINGS: FULL

## 2024-10-31 ASSESSMENT — TONOMETRY
OD_IOP_MMHG: 12
OS_IOP_MMHG: 12

## 2024-11-25 ENCOUNTER — OFFICE (OUTPATIENT)
Dept: URBAN - METROPOLITAN AREA CLINIC 105 | Facility: CLINIC | Age: 89
Setting detail: OPHTHALMOLOGY
End: 2024-11-25
Payer: MEDICARE

## 2024-11-25 DIAGNOSIS — H34.8311: ICD-10-CM

## 2024-11-25 PROCEDURE — 67028 INJECTION EYE DRUG: CPT | Mod: 58,RT | Performed by: OPHTHALMOLOGY

## 2024-11-25 ASSESSMENT — VISUAL ACUITY
OD_BCVA: 20/40+
OS_BCVA: 20/400

## 2024-11-25 ASSESSMENT — KERATOMETRY
OS_K2POWER_DIOPTERS: 45.50
OS_K1POWER_DIOPTERS: 45.25
OD_K1POWER_DIOPTERS: 44.25
OD_AXISANGLE_DEGREES: 010
OD_K2POWER_DIOPTERS: 45.50
OS_AXISANGLE_DEGREES: 173

## 2024-11-25 ASSESSMENT — REFRACTION_AUTOREFRACTION
OD_SPHERE: -1.50
OS_AXIS: 094
OD_CYLINDER: -1.25
OS_CYLINDER: -1.00
OS_SPHERE: -0.25
OD_AXIS: 125

## 2024-11-25 ASSESSMENT — CONFRONTATIONAL VISUAL FIELD TEST (CVF)
OD_FINDINGS: FULL
OS_FINDINGS: FULL

## 2024-11-25 ASSESSMENT — TONOMETRY
OS_IOP_MMHG: 14
OD_IOP_MMHG: 15

## 2025-01-27 ENCOUNTER — OFFICE (OUTPATIENT)
Dept: URBAN - METROPOLITAN AREA CLINIC 105 | Facility: CLINIC | Age: 89
Setting detail: OPHTHALMOLOGY
End: 2025-01-27
Payer: MEDICARE

## 2025-01-27 DIAGNOSIS — H34.8311: ICD-10-CM

## 2025-01-27 PROCEDURE — 67028 INJECTION EYE DRUG: CPT | Mod: RT | Performed by: OPHTHALMOLOGY

## 2025-01-27 PROCEDURE — 92134 CPTRZ OPH DX IMG PST SGM RTA: CPT | Performed by: OPHTHALMOLOGY

## 2025-01-27 ASSESSMENT — VISUAL ACUITY
OD_BCVA: 20/40-2
OS_BCVA: 20/200

## 2025-01-27 ASSESSMENT — KERATOMETRY
OS_K2POWER_DIOPTERS: 45.50
OD_K1POWER_DIOPTERS: 44.25
OD_K2POWER_DIOPTERS: 45.50
OD_AXISANGLE_DEGREES: 010
OS_AXISANGLE_DEGREES: 173
OS_K1POWER_DIOPTERS: 45.25

## 2025-01-27 ASSESSMENT — TONOMETRY
OS_IOP_MMHG: 13
OD_IOP_MMHG: 14

## 2025-01-27 ASSESSMENT — REFRACTION_AUTOREFRACTION
OD_SPHERE: -1.50
OS_SPHERE: -0.25
OS_AXIS: 094
OD_AXIS: 125
OD_CYLINDER: -1.25
OS_CYLINDER: -1.00

## 2025-01-27 ASSESSMENT — CONFRONTATIONAL VISUAL FIELD TEST (CVF)
OS_FINDINGS: FULL
OD_FINDINGS: FULL

## 2025-03-10 ENCOUNTER — OFFICE (OUTPATIENT)
Dept: URBAN - METROPOLITAN AREA CLINIC 105 | Facility: CLINIC | Age: 89
Setting detail: OPHTHALMOLOGY
End: 2025-03-10
Payer: MEDICARE

## 2025-03-10 DIAGNOSIS — H34.8311: ICD-10-CM

## 2025-03-10 PROCEDURE — 67210 TREATMENT OF RETINAL LESION: CPT | Mod: RT | Performed by: OPHTHALMOLOGY

## 2025-03-10 ASSESSMENT — KERATOMETRY
OD_K1POWER_DIOPTERS: 44.25
OD_AXISANGLE_DEGREES: 010
OS_K1POWER_DIOPTERS: 45.25
OS_AXISANGLE_DEGREES: 173
OS_K2POWER_DIOPTERS: 45.50
OD_K2POWER_DIOPTERS: 45.50

## 2025-03-10 ASSESSMENT — REFRACTION_AUTOREFRACTION
OD_AXIS: 125
OD_CYLINDER: -1.25
OS_SPHERE: -0.25
OS_CYLINDER: -1.00
OS_AXIS: 094
OD_SPHERE: -1.50

## 2025-03-10 ASSESSMENT — TONOMETRY
OD_IOP_MMHG: 15
OS_IOP_MMHG: 15

## 2025-03-10 ASSESSMENT — VISUAL ACUITY
OS_BCVA: 20/200
OD_BCVA: 20/40-2

## 2025-03-10 ASSESSMENT — CONFRONTATIONAL VISUAL FIELD TEST (CVF)
OS_FINDINGS: FULL
OD_FINDINGS: FULL

## 2025-03-19 ENCOUNTER — OFFICE (OUTPATIENT)
Dept: URBAN - METROPOLITAN AREA CLINIC 105 | Facility: CLINIC | Age: 89
Setting detail: OPHTHALMOLOGY
End: 2025-03-19
Payer: MEDICARE

## 2025-03-19 DIAGNOSIS — H34.8311: ICD-10-CM

## 2025-03-19 PROCEDURE — 67028 INJECTION EYE DRUG: CPT | Mod: 58,RT | Performed by: OPHTHALMOLOGY

## 2025-03-19 PROCEDURE — 92134 CPTRZ OPH DX IMG PST SGM RTA: CPT | Performed by: OPHTHALMOLOGY

## 2025-03-19 ASSESSMENT — KERATOMETRY
OD_K1POWER_DIOPTERS: 44.25
OS_K2POWER_DIOPTERS: 45.50
OD_AXISANGLE_DEGREES: 010
OS_AXISANGLE_DEGREES: 173
OS_K1POWER_DIOPTERS: 45.25
OD_K2POWER_DIOPTERS: 45.50

## 2025-03-19 ASSESSMENT — REFRACTION_AUTOREFRACTION
OS_AXIS: 094
OD_AXIS: 125
OD_CYLINDER: -1.25
OS_CYLINDER: -1.00
OS_SPHERE: -0.25
OD_SPHERE: -1.50

## 2025-03-19 ASSESSMENT — CONFRONTATIONAL VISUAL FIELD TEST (CVF)
OD_FINDINGS: FULL
OS_FINDINGS: FULL

## 2025-03-19 ASSESSMENT — VISUAL ACUITY
OS_BCVA: 20/400
OD_BCVA: 20/50-

## 2025-03-19 ASSESSMENT — TONOMETRY
OS_IOP_MMHG: 17
OD_IOP_MMHG: 14

## 2025-06-18 ENCOUNTER — OFFICE (OUTPATIENT)
Dept: URBAN - METROPOLITAN AREA CLINIC 113 | Facility: CLINIC | Age: 89
Setting detail: OPHTHALMOLOGY
End: 2025-06-18
Payer: MEDICARE

## 2025-06-18 DIAGNOSIS — H40.1132: ICD-10-CM

## 2025-06-18 PROCEDURE — 92133 CPTRZD OPH DX IMG PST SGM ON: CPT | Performed by: OPHTHALMOLOGY

## 2025-06-18 PROCEDURE — 92012 INTRM OPH EXAM EST PATIENT: CPT | Performed by: OPHTHALMOLOGY

## 2025-06-18 PROCEDURE — 92083 EXTENDED VISUAL FIELD XM: CPT | Performed by: OPHTHALMOLOGY

## 2025-06-18 ASSESSMENT — REFRACTION_AUTOREFRACTION
OS_AXIS: 095
OD_AXIS: 130
OS_CYLINDER: -1.50
OD_SPHERE: -2.00
OD_CYLINDER: -0.75
OS_SPHERE: -0.75

## 2025-06-18 ASSESSMENT — KERATOMETRY
OD_K1POWER_DIOPTERS: 45.00
OS_K2POWER_DIOPTERS: 46.25
OD_AXISANGLE_DEGREES: 176
OS_K1POWER_DIOPTERS: 44.50
OD_K2POWER_DIOPTERS: 45.50
OS_AXISANGLE_DEGREES: 028

## 2025-06-18 ASSESSMENT — CONFRONTATIONAL VISUAL FIELD TEST (CVF)
OD_FINDINGS: FULL
OS_FINDINGS: FULL

## 2025-06-18 ASSESSMENT — VISUAL ACUITY
OS_BCVA: 20/150
OD_BCVA: 20/50

## 2025-06-18 ASSESSMENT — TONOMETRY
OD_IOP_MMHG: 11
OS_IOP_MMHG: 11